# Patient Record
Sex: FEMALE | Race: WHITE | NOT HISPANIC OR LATINO | Employment: UNEMPLOYED | URBAN - METROPOLITAN AREA
[De-identification: names, ages, dates, MRNs, and addresses within clinical notes are randomized per-mention and may not be internally consistent; named-entity substitution may affect disease eponyms.]

---

## 2017-01-09 ENCOUNTER — OFFICE VISIT (OUTPATIENT)
Dept: OBSTETRICS AND GYNECOLOGY | Facility: CLINIC | Age: 67
End: 2017-01-09

## 2017-01-09 VITALS
WEIGHT: 149.6 LBS | BODY MASS INDEX: 26.51 KG/M2 | HEIGHT: 63 IN | DIASTOLIC BLOOD PRESSURE: 82 MMHG | SYSTOLIC BLOOD PRESSURE: 128 MMHG

## 2017-01-09 DIAGNOSIS — E03.9 ACQUIRED HYPOTHYROIDISM: ICD-10-CM

## 2017-01-09 DIAGNOSIS — Z13.9 SCREENING: ICD-10-CM

## 2017-01-09 DIAGNOSIS — Z12.31 VISIT FOR SCREENING MAMMOGRAM: ICD-10-CM

## 2017-01-09 DIAGNOSIS — Z01.419 WELL WOMAN EXAM WITH ROUTINE GYNECOLOGICAL EXAM: Primary | ICD-10-CM

## 2017-01-09 DIAGNOSIS — F51.04 CHRONIC INSOMNIA: ICD-10-CM

## 2017-01-09 LAB
BILIRUB BLD-MCNC: NEGATIVE MG/DL
CLARITY, POC: CLEAR
COLOR UR: YELLOW
GLUCOSE UR STRIP-MCNC: NEGATIVE MG/DL
KETONES UR QL: NEGATIVE
LEUKOCYTE EST, POC: NEGATIVE
NITRITE UR-MCNC: NEGATIVE MG/ML
PH UR: 5.5 [PH] (ref 5–8)
PROT UR STRIP-MCNC: NEGATIVE MG/DL
RBC # UR STRIP: ABNORMAL /UL
SP GR UR: 1.01 (ref 1–1.03)
UROBILINOGEN UR QL: NORMAL

## 2017-01-09 PROCEDURE — G0101 CA SCREEN;PELVIC/BREAST EXAM: HCPCS | Performed by: OBSTETRICS & GYNECOLOGY

## 2017-01-09 PROCEDURE — 81002 URINALYSIS NONAUTO W/O SCOPE: CPT | Performed by: OBSTETRICS & GYNECOLOGY

## 2017-01-09 RX ORDER — TEMAZEPAM 30 MG/1
30 CAPSULE ORAL NIGHTLY PRN
Qty: 30 CAPSULE | Refills: 5 | Status: SHIPPED | OUTPATIENT
Start: 2017-01-09

## 2017-01-09 RX ORDER — LEVOTHYROXINE SODIUM 50 MCG
TABLET ORAL
COMMUNITY
Start: 2016-10-22

## 2017-01-09 RX ORDER — TEMAZEPAM 30 MG/1
CAPSULE ORAL
COMMUNITY
Start: 2017-01-05 | End: 2017-01-09 | Stop reason: SDUPTHER

## 2017-01-09 NOTE — PROGRESS NOTES
Chief Complaint   Patient presents with   • Menopause     mammogram [facility] 10/3/2016, colonoscopy 2015       Kelli Matias is a 66 y.o. who presents with a request to refill her temazepam.  Her Meche [facility] was read as normal in October.  She is still sexually active but has no problems and does not use any estrogen hormone support for vaginal dryness.  Her colonoscopy was up-to-date in 2015.  There are no gynecological complaints and she is not on any hormone replacement therapy.    No Additional Complaints Reported    The following portions of the patient's history were reviewed and updated as appropriate:vital signs, allergies, current medications, past medical history, past social history, past surgical history and problem list    Review of Systems   Constitutional: Negative for fatigue and unexpected weight change.   HENT: Negative for trouble swallowing.    Eyes: Negative for visual disturbance.   Respiratory: Negative for cough, shortness of breath and wheezing.    Cardiovascular: Negative for leg swelling.   Gastrointestinal: Negative for abdominal distention, abdominal pain, anal bleeding, blood in stool, constipation, diarrhea, nausea and rectal pain.   Genitourinary: Negative for dyspareunia, dysuria, enuresis, frequency, genital sores, hematuria, menstrual problem, pelvic pain, urgency, vaginal bleeding, vaginal discharge and vaginal pain.   Musculoskeletal: Negative for arthralgias, back pain, myalgias and neck pain.   Skin: Negative for rash and wound.   Allergic/Immunologic: Negative for environmental allergies, food allergies and immunocompromised state.   Neurological: Negative for tremors, seizures, syncope, weakness and headaches.   Hematological: Negative for adenopathy. Does not bruise/bleed easily.   Psychiatric/Behavioral: Positive for sleep disturbance. Negative for dysphoric mood and suicidal ideas. The patient is not nervous/anxious.        Objective       "  Visit Vitals   • /82   • Ht 63\" (160 cm)   • Wt 149 lb 9.6 oz (67.9 kg)   • BMI 26.5 kg/m2       Physical Exam   Constitutional: She is oriented to person, place, and time. She appears well-developed and well-nourished.   HENT:   Head: Normocephalic.   Eyes: EOM are normal. Pupils are equal, round, and reactive to light.   Neck: Normal range of motion. No thyromegaly present.   Cardiovascular: Normal rate, regular rhythm and normal heart sounds.    No murmur heard.  Pulmonary/Chest: Effort normal and breath sounds normal. She exhibits no mass and no tenderness. Right breast exhibits no inverted nipple, no mass, no nipple discharge, no skin change and no tenderness. Left breast exhibits no inverted nipple, no mass, no nipple discharge, no skin change and no tenderness. There is no breast swelling.   Abdominal: Soft. Bowel sounds are normal. She exhibits no distension and no mass. There is no tenderness. There is no rebound and no guarding. No hernia. Hernia confirmed negative in the right inguinal area and confirmed negative in the left inguinal area.   Genitourinary: Vagina normal. Rectal exam shows no external hemorrhoid and no fissure. No breast tenderness, discharge or bleeding. Pelvic exam was performed with patient supine. No labial fusion. There is no rash, tenderness, lesion or injury on the right labia. There is no rash, tenderness, lesion or injury on the left labia. Right adnexum displays no mass, no tenderness and no fullness. Left adnexum displays no mass, no tenderness and no fullness. No erythema, tenderness or bleeding in the vagina. No foreign body in the vagina. No signs of injury around the vagina. No vaginal discharge found.   Genitourinary Comments: Pelvic Exam  External genitalia BUS:    are normal. The Anterior vaginal walls reveal good support.  The vagina is atrophic without a discharge.  The posterior vaginal wall reveals no rectocoele.  The uterus is is surgically absent. The  " Adnexa are are surgically absent. The  Bimanual exam is free of masses.   Musculoskeletal: Normal range of motion. She exhibits no edema.   Lymphadenopathy:     She has no cervical adenopathy.        Right: No inguinal adenopathy present.        Left: No inguinal adenopathy present.   Neurological: She is alert and oriented to person, place, and time.   Skin: No rash noted. No erythema. No pallor.   Psychiatric: She has a normal mood and affect. Her behavior is normal.   Vitals reviewed.      Labs: I have reviewed, verified and personally updated the past pap, mammogram (IF PERFORMED) and labs (IF ANY) for past visits.    Assessment/Plan     1. Well woman exam with routine gynecological exam    2. Screening    3. Visit for screening mammogram    4. Chronic insomnia    5. Acquired hypothyroidism        PLAN  1.   Orders Placed This Encounter   Procedures   • POC Urinalysis Dipstick       2. Medications prescribed this encounter:        Current Outpatient Prescriptions   Medication Sig Dispense Refill   • SYNTHROID 50 MCG tablet      • temazepam (RESTORIL) 30 MG capsule Take 1 capsule by mouth At Night As Needed for sleep. 30 capsule 5     No current facility-administered medications for this visit.        3. There was counseling on the topics including   healthy eating habits, breast health and alternative homeopathic sleep alternatives that are sometimes helpful.  The patient understood these issues better when she left the office.     Follow up: 1  year(s)    Oma Abreu MD  1/9/2017

## 2017-01-09 NOTE — MR AVS SNAPSHOT
Kelli Matias   1/9/2017 1:00 PM   Office Visit    Dept Phone:  [phone]   Encounter #:  [id number]    Provider:  Oma Abreu MD   Department:  [facility]                Your Full Care Plan              Today's Medication Changes          These changes are accurate as of: 1/9/17  1:52 PM.  If you have any questions, ask your nurse or doctor.               Medication(s)that have changed:     temazepam 30 MG capsule   Commonly known as:  RESTORIL   Take 1 capsule by mouth At Night As Needed for sleep.   What changed:    - how much to take  - how to take this  - when to take this  - reasons to take this   Changed by:  Oma Abreu MD            Where to Get Your Medications      You can get these medications from any pharmacy     Bring a paper prescription for each of these medications     temazepam 30 MG capsule                  Your Updated Medication List          This list is accurate as of: 1/9/17  1:52 PM.  Always use your most recent med list.                SYNTHROID 50 MCG tablet   Generic drug:  levothyroxine       temazepam 30 MG capsule   Commonly known as:  RESTORIL   Take 1 capsule by mouth At Night As Needed for sleep.               We Performed the Following     POC Urinalysis Dipstick       You Were Diagnosed With        Codes Comments    Well woman exam with routine gynecological exam    -  Primary ICD-10-CM: Z01.419  ICD-9-CM: V72.31     Screening     ICD-10-CM: Z13.9  ICD-9-CM: V82.9     Visit for screening mammogram     ICD-10-CM: Z12.31  ICD-9-CM: V76.12     Chronic insomnia     ICD-10-CM: F51.04  ICD-9-CM: 780.52     Acquired hypothyroidism     ICD-10-CM: E03.9  ICD-9-CM: 244.9       Instructions     None    Patient Instructions History      Upcoming Appointments     Visit Type Date Time Department    ANNUAL 1/9/2017  1:00 PM DESIREE Obando Signup     [facility] [email or web address] allows you to send messages to your  "doctor, view your test results, renew your prescriptions, schedule appointments, and more. To sign up, go to [email or web address] and click on the Sign Up Now link in the New User? box. Enter your [employer] Activation Code exactly as it appears below along with the last four digits of your Social Security Number and your Date of Birth () to complete the sign-up process. If you do not sign up before the expiration date, you must request a new code.    [employer] Activation Code: [email or web address]  Expires: 2017  1:52 PM    If you have questions, you can email [email or web address] or call [phone] to talk to our [employer] staff. Remember, [employer] is NOT to be used for urgent needs. For medical emergencies, dial 911.               Other Info from Your Visit           Allergies     Meperidine  Nausea And Vomiting      Reason for Visit     Menopause mammogram [facility] 10/3/2016, colonoscopy       Vital Signs     Blood Pressure Height Weight Body Mass Index Smoking Status       128/82 63\" (160 cm) 149 lb 9.6 oz (67.9 kg) 26.5 kg/m2 Never Smoker       Problems and Diagnoses Noted     Well woman exam with routine gynecological exam    -  Primary    Screening        Encounter for screening mammogram for breast cancer        Chronic insomnia        Acquired underactive thyroid          Results     POC Urinalysis Dipstick      Component Value Standard Range & Units    Color Yellow Yellow, Straw, Dark Yellow, Evelyn    Clarity, UA Clear Clear    Glucose, UA Negative Negative, 1000 mg/dL (3+) mg/dL    Bilirubin Negative Negative    Ketones, UA Negative Negative    Specific Gravity  1.010 1.005 - 1.030    Blood, UA Trace Negative    pH, Urine 5.5 5.0 - 8.0    Protein, POC Negative Negative mg/dL    Urobilinogen, UA Normal Normal    Leukocytes Negative Negative    Nitrite, UA Negative Negative                    "

## 2017-08-08 DIAGNOSIS — F51.04 CHRONIC INSOMNIA: ICD-10-CM

## 2017-08-09 RX ORDER — TEMAZEPAM 30 MG/1
30 CAPSULE ORAL NIGHTLY PRN
Qty: 30 CAPSULE | Refills: 2 | OUTPATIENT
Start: 2017-08-09

## 2017-08-09 NOTE — TELEPHONE ENCOUNTER
Medication is scheduled and cannot be prescribed electronically.  The patient will need to make an appointment